# Patient Record
Sex: MALE | Race: WHITE | NOT HISPANIC OR LATINO | Employment: FULL TIME | URBAN - METROPOLITAN AREA
[De-identification: names, ages, dates, MRNs, and addresses within clinical notes are randomized per-mention and may not be internally consistent; named-entity substitution may affect disease eponyms.]

---

## 2019-03-15 ENCOUNTER — TELEPHONE (OUTPATIENT)
Dept: PSYCHIATRY | Facility: CLINIC | Age: 59
End: 2019-03-15

## 2019-03-15 NOTE — TELEPHONE ENCOUNTER
Behavorial Health Outpatient Intake Questions    Referred by: info link    Check with provider before scheduling    Are there any developmental disabilities? No    Does the patient have hearing impairment? No    Does the patient have ICM or CTT? No    Taking injectable psychiatric medications? NoIf yes, patient can not be seen here  Has the patient ever seen or currently see a psychiatrist? No If yes who/when? Has the patient ever seen or currently see a therapist? Yes If yes who/when? In 2004    How many visits did the pt have for previous psychiatric treatment?  History    Has the patient served in the [place] [address]? No    If yes, have you had combat services? No    Was the patient activated into federal active duty as a member of the national guard or reserve? No    Minor Child    Who has custody of the child? Is there a custody agreement? If there is a custody agreement remind parent that they must bring a copy to the first appt or they will not be seen  [facility]     Presenting Problem (in patient's words) suspected ADD    Substance Abuse:No concerns of substance abuse are reported  Has the patient been seen here previously, either inpatient or outpatient? No outpatient    If seen as outpatient, what provider(s) did the patient see? A member of the patient's family has been in therapy here with none    ACCEPTED as a patient  Appointment Date:     Referred Elsewhere? Primary Care Physician: No primary care provider on file  PCP telephone number: Lora  [address] [facility]  ----------------------------------------------------------------------------------------------------------------------    Insurance subscriber:      radha TREVIZO;     Address:                                                        Phone: SSN:    Employer:    [employer]                                                  Address:  ----------------------------------------------------------------------------------------------------------------------    Primary Insurance:  malick                                                               Phone:[phone]    ID number:    [id number]                                               Group number:[id number]  ----------------------------------------------------------------------------------------------------------------------    Secondary Insurance:                                                               Phone:    ID number:                                                   Group number:  ----------------------------------------------------------------------------------------------------------------------    Other insurance information:             _______________________________________

## 2019-04-05 ENCOUNTER — OFFICE VISIT (OUTPATIENT)
Dept: BEHAVIORAL/MENTAL HEALTH CLINIC | Facility: CLINIC | Age: 59
End: 2019-04-05
Payer: COMMERCIAL

## 2019-04-05 DIAGNOSIS — F90.0 ADHD (ATTENTION DEFICIT HYPERACTIVITY DISORDER), INATTENTIVE TYPE: Primary | ICD-10-CM

## 2019-04-05 PROBLEM — Z78.9 KNOWN HEALTH PROBLEMS: NONE: Status: ACTIVE | Noted: 2019-04-05

## 2019-04-05 PROCEDURE — 90792 PSYCH DIAG EVAL W/MED SRVCS: CPT | Performed by: PSYCHIATRY & NEUROLOGY

## 2019-04-05 RX ORDER — ATOMOXETINE 40 MG/1
40 CAPSULE ORAL DAILY
Qty: 7 CAPSULE | Refills: 0 | Status: SHIPPED | OUTPATIENT
Start: 2019-04-05 | End: 2020-01-23 | Stop reason: ALTCHOICE

## 2019-04-05 RX ORDER — ATOMOXETINE 40 MG/1
80 CAPSULE ORAL DAILY
Qty: 60 CAPSULE | Refills: 0 | Status: SHIPPED | OUTPATIENT
Start: 2019-04-12 | End: 2020-01-23 | Stop reason: ALTCHOICE

## 2019-12-01 PROBLEM — Z00.00 ENCOUNTER FOR WELLNESS EXAMINATION IN ADULT: Status: ACTIVE | Noted: 2019-12-01

## 2019-12-01 NOTE — ASSESSMENT & PLAN NOTE
 Screening for metabolic and lipoid disorders indicated at this time  Patient agrees to screening at this time   Immunizations indicated at this time: Influenza  Patient refuses immunization(s) at this time   STI screening indicated at this time  Patient refuses STI screening   Cancer screening: Colonoscopy indicated at this time  Patient refuses screening at this time  Plan   CMP and fasting lipid panel ordered    Will discuss STI screening (including Hep C screening) and colonoscopy at next visit     F/u in 3 months

## 2019-12-01 NOTE — PROGRESS NOTES
Family Medicine Follow-Up Office Visit  Janeen Clemons 61 y o  male   MRN: [id number] : 1960  ENCOUNTER: 2019 12:35 PM    Assessment and Plan   Encounter for wellness examination in adult   Screening for metabolic and lipoid disorders indicated at this time  Patient agrees to screening at this time   Immunizations indicated at this time: Influenza  Patient refuses immunization(s) at this time   STI screening indicated at this time  Patient refuses STI screening   Cancer screening: Colonoscopy indicated at this time  Patient refuses screening at this time  Plan   CMP and fasting lipid panel ordered    Will discuss STI screening (including Hep C screening) and colonoscopy at next visit   F/u in 3 months        Performance anxiety  Pt has recently had anxiety, palpitations, sweating, and "stress" when performing/presenting "in front of large groups for work " The night before his presentations, he has difficulty sleeping and feels anxious about the upcoming presentation as he presents to approx  2500 people at times  He doesn't experience these symptoms otherwise  His friend told him about propranolol and he would like to try it     - He denies chest pain, LOC, light-headedness with change in position/standing from seated position, and SOB  Plan   - Propranolol 10 mg 1-2 hours before presentation/performance    - Pt advised to go to ED if experiences LOC, chest pain, SOB, palpitations  Also advised patient to call clinic/go to ED if experiences new symptoms  Pt advised to call clinic if no improvement in symptoms     - F/u in 3 months, sooner if necessary       Chief Complaint     Chief Complaint   Patient presents with    Anxiety     related to public speaking       History of Present Illness   Janeen Clemons is a 61y o -year-old male with a PMH of No past medical history on file   who presents today for Anxiety (related to public speaking)       Now performing in front of larger group, 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 and has been feeling "stressed " Friend told him about propranolol and says it worked for him and would like to try it  Hasn't had this anxiety infront of smaller groups  Night before he can't sleep and is stressed  "I make it through but the presentations are rough " Has a hard time breathing and can feel his heart "pounding " "It's what most people describe as stage fright "     Anxiety   Patient reports no chest pain, confusion, dizziness, nausea or palpitations  FamHx  family history includes ADD / ADHD in his sister; COPD in his mother; Depression in his father; Hypertension in his father  Review of Systems   Review of Systems   Constitutional: Negative for chills and fever  HENT: Negative for rhinorrhea  Eyes: Negative for visual disturbance  Respiratory: Negative for cough  Cardiovascular: Negative for chest pain, palpitations and leg swelling  Denies SOB, light-headedness with change in position    Gastrointestinal: Negative for constipation, diarrhea, nausea and vomiting  Endocrine: Positive for polydipsia and polyuria (had prostate exam, was told it was swollen)  Genitourinary: Positive for difficulty urinating  Negative for dysuria  Musculoskeletal: Positive for arthralgias (right middle finger pain after fall 3 months ago)  Skin: Negative for rash  Allergic/Immunologic:        NKDA   Neurological: Negative for dizziness  Psychiatric/Behavioral: Negative for confusion  All other systems reviewed and are negative  Active Problem List     Patient Active Problem List   Diagnosis    Known health problems: none    ADHD (attention deficit hyperactivity disorder), inattentive type    Encounter for wellness examination in adult    Performance anxiety       Past Medical History, Past Surgical History, Family History, and Social History were reviewed and updated today as appropriate      Objective   /88 (BP Location: Right arm, Patient Position: Sitting, Cuff Size: Large)   Pulse 64   Temp (!) 96 9 °F (36 1 °C)   Resp 20   Ht 6' (1 829 m)   Wt 94 7 kg (208 lb 12 8 oz)   BMI 28 32 kg/m²     Physical Exam   Constitutional: He is oriented to person, place, and time  No distress  HENT:   Head: Normocephalic and atraumatic  Nose: Nose normal    Eyes: Conjunctivae are normal  Right eye exhibits no discharge  Left eye exhibits no discharge  No scleral icterus  Neck: No JVD present  Cardiovascular: Normal rate, regular rhythm, normal heart sounds and intact distal pulses  Exam reveals no gallop and no friction rub  No murmur heard  Radial pulses strong and regular bilaterally     Pulmonary/Chest: Effort normal and breath sounds normal  No stridor  No respiratory distress  He has no wheezes  He has no rales  He exhibits no tenderness  Abdominal: Soft  Bowel sounds are normal  He exhibits no distension  There is no tenderness  There is no guarding  Musculoskeletal: He exhibits no edema (of lower extremities)  Neurological: He is alert and oriented to person, place, and time  Skin: Skin is warm and dry  Capillary refill takes less than 2 seconds  He is not diaphoretic  Psychiatric: He has a normal mood and affect  Nursing note and vitals reviewed  Pertinent Laboratory/Diagnostic Studies:  No results found for: GLUCOSE, BUN, CREATININE, CALCIUM, NA, K, CO2, CL  No results found for: ALT, AST, GGT, ALKPHOS, BILITOT    No results found for: WBC, HGB, HCT, MCV, PLT    No results found for: TSH    No results found for: CHOL  No results found for: TRIG  No results found for: HDL  No results found for: LDLCALC  No results found for: HGBA1C    No results found for this or any previous visit      Orders Placed This Encounter   Procedures    Comprehensive metabolic panel    Lipid Panel with Direct LDL reflex         Current Medications     Current Outpatient Medications   Medication Sig Dispense Refill    atoMOXetine (STRATTERA) 40 mg capsule Take 1 capsule (40 mg total) by mouth daily For 7 days (Patient not taking: Reported on 12/2/2019) 7 capsule 0    atomoxetine (STRATTERA) 40 mg capsule Take 2 capsules (80 mg total) by mouth daily (Patient not taking: Reported on 12/2/2019) 60 capsule 0    propranolol (INDERAL) 10 mg tablet Take 1 tablet (10 mg total) by mouth as needed (Take 1-2 hours prior to performance ) 30 tablet 0     No current facility-administered medications for this visit  ALLERGIES:  No Known Allergies    Health Maintenance     Health Maintenance   Topic Date Due    Hepatitis C Screening  1960    Depression Screening PHQ  1960    CRC Screening: Colonoscopy  1960    DTaP,Tdap,and Td Vaccines (1 - Tdap) 10/02/1971    HIV Screening  10/02/1975    BMI: Followup Plan  10/02/1978    Influenza Vaccine  07/01/2019    BMI: Adult  12/02/2020    Pneumococcal Vaccine: 65+ Years (1 of 2 - PCV13) 10/02/2025    Pneumococcal Vaccine: Pediatrics (0 to 5 Years) and At-Risk Patients (6 to 59 Years)  Aged Out    HIB Vaccine  Aged Out    Hepatitis B Vaccine  Aged Out    IPV Vaccine  Aged Out    Hepatitis A Vaccine  Aged Out    Meningococcal ACWY Vaccine  Aged Out    HPV Vaccine  Aged Out       There is no immunization history on file for this patient  Marybel Moscoso MD   [address]  12/2/2019  12:35 PM    Parts of this note were dictated using [employer] dictation software and may have sounds-like errors due to variation in pronunciation

## 2019-12-02 ENCOUNTER — OFFICE VISIT (OUTPATIENT)
Dept: FAMILY MEDICINE CLINIC | Facility: CLINIC | Age: 59
End: 2019-12-02
Payer: COMMERCIAL

## 2019-12-02 VITALS
SYSTOLIC BLOOD PRESSURE: 130 MMHG | HEIGHT: 72 IN | TEMPERATURE: 96.9 F | DIASTOLIC BLOOD PRESSURE: 88 MMHG | WEIGHT: 208.8 LBS | RESPIRATION RATE: 20 BRPM | BODY MASS INDEX: 28.28 KG/M2 | HEART RATE: 64 BPM

## 2019-12-02 DIAGNOSIS — R35.89 POLYURIA: ICD-10-CM

## 2019-12-02 DIAGNOSIS — Z13.220 SCREENING FOR LIPOID DISORDERS: ICD-10-CM

## 2019-12-02 DIAGNOSIS — Z00.00 ENCOUNTER FOR WELLNESS EXAMINATION IN ADULT: ICD-10-CM

## 2019-12-02 DIAGNOSIS — F41.8 PERFORMANCE ANXIETY: Primary | ICD-10-CM

## 2019-12-02 PROCEDURE — 99213 OFFICE O/P EST LOW 20 MIN: CPT | Performed by: FAMILY MEDICINE

## 2019-12-02 PROCEDURE — 3008F BODY MASS INDEX DOCD: CPT | Performed by: FAMILY MEDICINE

## 2019-12-02 RX ORDER — PROPRANOLOL HYDROCHLORIDE 10 MG/1
10 TABLET ORAL AS NEEDED
Qty: 30 TABLET | Refills: 0 | Status: SHIPPED | OUTPATIENT
Start: 2019-12-02

## 2019-12-02 NOTE — ASSESSMENT & PLAN NOTE
Pt has recently had anxiety, palpitations, sweating, and "stress" when performing/presenting "in front of large groups for work " The night before his presentations, he has difficulty sleeping and feels anxious about the upcoming presentation as he presents to approx  2500 people at times  He doesn't experience these symptoms otherwise  His friend told him about propranolol and he would like to try it     - He denies chest pain, LOC, light-headedness with change in position/standing from seated position, and SOB  Plan   - Propranolol 10 mg 1-2 hours before presentation/performance    - Pt advised to go to ED if experiences LOC, chest pain, SOB, palpitations  Also advised patient to call clinic/go to ED if experiences new symptoms   Pt advised to call clinic if no improvement in symptoms     - F/u in 3 months, sooner if necessary

## 2019-12-02 NOTE — PATIENT INSTRUCTIONS
Please seek medical attention/ go to emergency department if you experience loss of consciousness  If you develop any new symptoms while taking your medications, please call clinic or go to emergency department

## 2020-01-03 ENCOUNTER — TELEPHONE (OUTPATIENT)
Dept: FAMILY MEDICINE CLINIC | Facility: CLINIC | Age: 60
End: 2020-01-03

## 2020-01-17 ENCOUNTER — TELEPHONE (OUTPATIENT)
Dept: FAMILY MEDICINE CLINIC | Facility: CLINIC | Age: 60
End: 2020-01-17

## 2020-01-22 NOTE — PROGRESS NOTES
Family Medicine Follow-Up Office Visit  Adonis Valenzuela 61 y o  male   MRN: [id number] : 1960  ENCOUNTER: 2020 11:11 AM    Assessment and Plan   ADHD (attention deficit hyperactivity disorder), inattentive type  Previously intolerant of atomoxetine  Prescribed a trial of Vyvanse as recommended by Behavioral Health  Side-effect profile discussed  Patient will reach out with any decrease in appetite, palpitations, jittery feelings  I recommended trying medication for the 1st time on the weekend, and monitoring for any negative effect on his performance anxiety  Performance anxiety  Significant improvement with propranolol, only using a half dose each time  RTC 1-3 month for BW review and revisiting of ADHD  Pt will call colorectal team re: colonoscopy  Chief Complaint     Chief Complaint   Patient presents with    Follow-up     ADD       History of Present Illness   Adonis Valenzuela is a 61y o -year-old male who presents today for followup of performance anxiety  Felt like a half-dose of the propranolol helped quite a bit with presentations  Whole dose led to headache later  Was introduced to the concept of maybe having ADHD earlier this year, had confirmatory testing by behavioral health and started Strattera  Didn't do well, felt "stoned "  The doc had recommended perhaps trying Vyvanse - that physician has since left the country  No history of heart issues, palpitations  Review of Systems   Review of Systems   Constitutional: Negative for activity change, chills, fatigue and fever  HENT: Negative for congestion, sinus pressure, sinus pain and sore throat  Respiratory: Negative for cough, shortness of breath and wheezing  Cardiovascular: Negative for chest pain, palpitations and leg swelling  Gastrointestinal: Negative for abdominal pain, diarrhea, nausea and vomiting  Genitourinary: Negative for decreased urine volume, dysuria, frequency and urgency  Musculoskeletal: Negative for arthralgias, myalgias, neck pain and neck stiffness  Skin: Negative for rash  Neurological: Negative for dizziness, light-headedness, numbness and headaches  Psychiatric/Behavioral: Negative for agitation  Focus issues  Active Problem List     Patient Active Problem List   Diagnosis    Known health problems: none    ADHD (attention deficit hyperactivity disorder), inattentive type    Encounter for wellness examination in adult    Performance anxiety       Past Medical History, Past Surgical History, Family History, and Social History were reviewed and updated today as appropriate  Objective   /80   Pulse 98   Temp (!) 96 1 °F (35 6 °C)   Ht 6' (1 829 m)   Wt 97 5 kg (215 lb)   SpO2 96%   BMI 29 16 kg/m²     Physical Exam   Constitutional: He is oriented to person, place, and time  He appears well-developed and well-nourished  No distress  HENT:   Head: Normocephalic and atraumatic  Mouth/Throat: Oropharynx is clear and moist    Eyes: Pupils are equal, round, and reactive to light  Neck: Normal range of motion  Neck supple  Cardiovascular: Normal rate, regular rhythm and normal heart sounds  Exam reveals no gallop and no friction rub  No murmur heard  Pulmonary/Chest: Effort normal and breath sounds normal  No respiratory distress  He has no wheezes  He has no rales  Abdominal: Soft  There is no tenderness  Musculoskeletal: Normal range of motion  Neurological: He is alert and oriented to person, place, and time  Psychiatric: He has a normal mood and affect   Thought content normal      Diabetic Foot Exam    Pertinent Laboratory/Diagnostic Studies:  No results found for: GLUCOSE, BUN, CREATININE, CALCIUM, NA, K, CO2, CL  No results found for: ALT, AST, GGT, ALKPHOS, BILITOT    No results found for: WBC, HGB, HCT, MCV, PLT    No results found for: TSH    No results found for: CHOL  No results found for: TRIG  No results found for: HDL  No results found for: LDLCALC  No results found for: HGBA1C    No results found for this or any previous visit  No orders of the defined types were placed in this encounter  Current Medications     Current Outpatient Medications   Medication Sig Dispense Refill    propranolol (INDERAL) 10 mg tablet Take 1 tablet (10 mg total) by mouth as needed (Take 1-2 hours prior to performance ) 30 tablet 0    lisdexamfetamine (VYVANSE) 30 MG capsule Take 1 capsule (30 mg total) by mouth every morningMax Daily Amount: 30 mg 30 capsule 0     No current facility-administered medications for this visit  ALLERGIES:  No Known Allergies    Health Maintenance     Health Maintenance   Topic Date Due    Hepatitis C Screening  1960    CRC Screening: Colonoscopy  1960    DTaP,Tdap,and Td Vaccines (1 - Tdap) 10/02/1971    HIV Screening  10/02/1975    BMI: Followup Plan  10/02/1978    Annual Physical  10/02/1978    Influenza Vaccine  07/01/2019    Depression Screening PHQ  01/23/2021    BMI: Adult  01/23/2021    Pneumococcal Vaccine: 65+ Years (1 of 2 - PCV13) 10/02/2025    Pneumococcal Vaccine: Pediatrics (0 to 5 Years) and At-Risk Patients (6 to 59 Years)  Aged Out    HIB Vaccine  Aged Out    Hepatitis B Vaccine  Aged Out    IPV Vaccine  Aged Out    Hepatitis A Vaccine  Aged Out    Meningococcal ACWY Vaccine  Aged Out    HPV Vaccine  Aged Out       There is no immunization history on file for this patient  Any Reed MD   [address]  1/23/2020  11:11 AM    Parts of this note were dictated using [employer] dictation software and may have sounds-like errors due to variation in pronunciation

## 2020-01-23 ENCOUNTER — OFFICE VISIT (OUTPATIENT)
Dept: FAMILY MEDICINE CLINIC | Facility: CLINIC | Age: 60
End: 2020-01-23
Payer: COMMERCIAL

## 2020-01-23 VITALS
OXYGEN SATURATION: 96 % | WEIGHT: 215 LBS | HEART RATE: 98 BPM | SYSTOLIC BLOOD PRESSURE: 130 MMHG | BODY MASS INDEX: 29.12 KG/M2 | DIASTOLIC BLOOD PRESSURE: 80 MMHG | TEMPERATURE: 96.1 F | HEIGHT: 72 IN

## 2020-01-23 DIAGNOSIS — F90.0 ADHD (ATTENTION DEFICIT HYPERACTIVITY DISORDER), INATTENTIVE TYPE: Primary | ICD-10-CM

## 2020-01-23 DIAGNOSIS — F41.8 PERFORMANCE ANXIETY: ICD-10-CM

## 2020-01-23 PROCEDURE — 99214 OFFICE O/P EST MOD 30 MIN: CPT | Performed by: FAMILY MEDICINE

## 2020-01-23 PROCEDURE — 3008F BODY MASS INDEX DOCD: CPT | Performed by: FAMILY MEDICINE

## 2020-01-23 NOTE — ASSESSMENT & PLAN NOTE
Previously intolerant of atomoxetine  Prescribed a trial of Vyvanse as recommended by Behavioral Health  Side-effect profile discussed  Patient will reach out with any decrease in appetite, palpitations, jittery feelings  I recommended trying medication for the 1st time on the weekend, and monitoring for any negative effect on his performance anxiety

## 2020-02-25 DIAGNOSIS — F90.0 ADHD (ATTENTION DEFICIT HYPERACTIVITY DISORDER), INATTENTIVE TYPE: ICD-10-CM

## 2020-02-25 NOTE — TELEPHONE ENCOUNTER
Strike that refills not permitted on Schedule II meds  OK to fill with just a phone call next month

## 2020-02-25 NOTE — TELEPHONE ENCOUNTER
Refills are reasonable in this case  I ordered a fill with 2 refills, and would like to see him approx 3 months after our last visit to see how he's doing  We can then space out visits from there

## 2020-02-25 NOTE — TELEPHONE ENCOUNTER
Is this pt able to get additional refill or do you prefer to order this monthly  He is doing well with the vyvanse

## 2020-03-30 DIAGNOSIS — F90.0 ADHD (ATTENTION DEFICIT HYPERACTIVITY DISORDER), INATTENTIVE TYPE: ICD-10-CM

## 2020-05-18 DIAGNOSIS — F90.0 ADHD (ATTENTION DEFICIT HYPERACTIVITY DISORDER), INATTENTIVE TYPE: ICD-10-CM

## 2020-05-26 ENCOUNTER — TELEPHONE (OUTPATIENT)
Dept: FAMILY MEDICINE CLINIC | Facility: CLINIC | Age: 60
End: 2020-05-26

## 2020-05-26 ENCOUNTER — OFFICE VISIT (OUTPATIENT)
Dept: FAMILY MEDICINE CLINIC | Facility: CLINIC | Age: 60
End: 2020-05-26
Payer: COMMERCIAL

## 2020-05-26 VITALS
TEMPERATURE: 98.7 F | BODY MASS INDEX: 28.69 KG/M2 | WEIGHT: 211.8 LBS | DIASTOLIC BLOOD PRESSURE: 78 MMHG | SYSTOLIC BLOOD PRESSURE: 124 MMHG | HEIGHT: 72 IN | HEART RATE: 88 BPM | RESPIRATION RATE: 18 BRPM | OXYGEN SATURATION: 95 %

## 2020-05-26 DIAGNOSIS — F90.0 ADHD (ATTENTION DEFICIT HYPERACTIVITY DISORDER), INATTENTIVE TYPE: Primary | ICD-10-CM

## 2020-05-26 DIAGNOSIS — Z12.11 COLON CANCER SCREENING: ICD-10-CM

## 2020-05-26 DIAGNOSIS — J45.30 MILD PERSISTENT ASTHMA WITHOUT COMPLICATION: ICD-10-CM

## 2020-05-26 PROBLEM — Z00.00 ROUTINE HEALTH MAINTENANCE: Status: ACTIVE | Noted: 2020-05-26

## 2020-05-26 PROCEDURE — 1036F TOBACCO NON-USER: CPT | Performed by: FAMILY MEDICINE

## 2020-05-26 PROCEDURE — 3008F BODY MASS INDEX DOCD: CPT | Performed by: FAMILY MEDICINE

## 2020-05-26 PROCEDURE — 99214 OFFICE O/P EST MOD 30 MIN: CPT | Performed by: FAMILY MEDICINE

## 2020-05-26 RX ORDER — BUDESONIDE AND FORMOTEROL FUMARATE DIHYDRATE 160; 4.5 UG/1; UG/1
2 AEROSOL RESPIRATORY (INHALATION) 2 TIMES DAILY
Qty: 1 INHALER | Refills: 3 | Status: SHIPPED | OUTPATIENT
Start: 2020-05-26

## 2020-05-26 RX ORDER — BUDESONIDE AND FORMOTEROL FUMARATE DIHYDRATE 160; 4.5 UG/1; UG/1
2 AEROSOL RESPIRATORY (INHALATION) 2 TIMES DAILY
COMMUNITY
End: 2020-05-26 | Stop reason: SDUPTHER

## 2020-05-31 ENCOUNTER — HOSPITAL ENCOUNTER (EMERGENCY)
Facility: HOSPITAL | Age: 60
Discharge: HOME | End: 2020-05-31
Attending: EMERGENCY MEDICINE | Admitting: EMERGENCY MEDICINE
Payer: COMMERCIAL

## 2020-05-31 ENCOUNTER — APPOINTMENT (EMERGENCY)
Dept: RADIOLOGY | Facility: HOSPITAL | Age: 60
End: 2020-05-31
Payer: COMMERCIAL

## 2020-05-31 DIAGNOSIS — S42.032A CLOSED DISPLACED FRACTURE OF ACROMIAL END OF LEFT CLAVICLE, INITIAL ENCOUNTER: Primary | ICD-10-CM
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
## 2020-05-31 PROCEDURE — 73000 X-RAY EXAM OF COLLAR BONE: CPT

## 2020-05-31 PROCEDURE — 73080 X-RAY EXAM OF ELBOW: CPT

## 2020-05-31 PROCEDURE — 90715 TDAP VACCINE 7 YRS/> IM: CPT | Performed by: EMERGENCY MEDICINE

## 2020-05-31 PROCEDURE — 90471 IMMUNIZATION ADMIN: CPT

## 2020-05-31 PROCEDURE — 99284 EMERGENCY DEPT VISIT MOD MDM: CPT | Performed by: EMERGENCY MEDICINE

## 2020-05-31 PROCEDURE — 71045 X-RAY EXAM CHEST 1 VIEW: CPT

## 2020-05-31 PROCEDURE — 99284 EMERGENCY DEPT VISIT MOD MDM: CPT

## 2020-05-31 PROCEDURE — 73030 X-RAY EXAM OF SHOULDER: CPT

## 2020-05-31 RX ORDER — OXYCODONE HYDROCHLORIDE 5 MG/1
5 TABLET ORAL ONCE
Status: COMPLETED | OUTPATIENT
Start: 2020-05-31 | End: 2020-05-31

## 2020-05-31 RX ORDER — METHOCARBAMOL 500 MG/1
500 TABLET, FILM COATED ORAL 3 TIMES DAILY PRN
Qty: 20 TABLET | Refills: 0 | Status: SHIPPED | OUTPATIENT
Start: 2020-05-31

## 2020-05-31 RX ORDER — MORPHINE SULFATE 15 MG/1
7.5 TABLET ORAL EVERY 4 HOURS PRN
Qty: 11 TABLET | Refills: 0 | Status: SHIPPED | OUTPATIENT
Start: 2020-05-31 | End: 2020-06-10

## 2020-05-31 RX ORDER — NAPROXEN 375 MG/1
375 TABLET ORAL 2 TIMES DAILY WITH MEALS
Qty: 30 TABLET | Refills: 0 | Status: SHIPPED | OUTPATIENT
Start: 2020-05-31

## 2020-05-31 RX ORDER — ACETAMINOPHEN 325 MG/1
975 TABLET ORAL ONCE
Status: COMPLETED | OUTPATIENT
Start: 2020-05-31 | End: 2020-05-31

## 2020-05-31 RX ORDER — ACETAMINOPHEN 500 MG
1000 TABLET ORAL EVERY 6 HOURS PRN
Qty: 30 TABLET | Refills: 0 | Status: SHIPPED | OUTPATIENT
Start: 2020-05-31

## 2020-05-31 RX ADMIN — OXYCODONE HYDROCHLORIDE 5 MG: 5 TABLET ORAL at 16:53

## 2020-05-31 RX ADMIN — ACETAMINOPHEN 975 MG: 325 TABLET ORAL at 16:53

## 2020-05-31 RX ADMIN — OXYCODONE HYDROCHLORIDE 5 MG: 5 TABLET ORAL at 18:31

## 2020-05-31 RX ADMIN — TETANUS TOXOID, REDUCED DIPHTHERIA TOXOID AND ACELLULAR PERTUSSIS VACCINE, ADSORBED 0.5 ML: 5; 2.5; 8; 8; 2.5 SUSPENSION INTRAMUSCULAR at 18:14

## 2020-06-02 VITALS — BODY MASS INDEX: 28.85 KG/M2 | HEIGHT: 72 IN | WEIGHT: 213 LBS

## 2020-06-02 DIAGNOSIS — S42.032A DISPLACED FRACTURE OF LATERAL END OF LEFT CLAVICLE, INITIAL ENCOUNTER FOR CLOSED FRACTURE: Primary | ICD-10-CM

## 2020-06-02 PROCEDURE — 3008F BODY MASS INDEX DOCD: CPT | Performed by: ORTHOPAEDIC SURGERY

## 2020-06-02 PROCEDURE — 99204 OFFICE O/P NEW MOD 45 MIN: CPT | Performed by: ORTHOPAEDIC SURGERY

## 2020-06-02 PROCEDURE — 1036F TOBACCO NON-USER: CPT | Performed by: ORTHOPAEDIC SURGERY

## 2020-06-02 RX ORDER — CHLORHEXIDINE GLUCONATE 4 G/100ML
SOLUTION TOPICAL DAILY PRN
Status: CANCELLED | OUTPATIENT
Start: 2020-06-02

## 2020-06-03 VITALS
WEIGHT: 213 LBS | SYSTOLIC BLOOD PRESSURE: 159 MMHG | OXYGEN SATURATION: 96 % | RESPIRATION RATE: 16 BRPM | TEMPERATURE: 97.9 F | HEART RATE: 84 BPM | BODY MASS INDEX: 28.89 KG/M2 | DIASTOLIC BLOOD PRESSURE: 86 MMHG

## 2020-06-03 LAB
BUN SERPL-MCNC: 19 MG/DL (ref 7–25)
BUN/CREAT SERPL: NORMAL (CALC) (ref 6–22)
CALCIUM SERPL-MCNC: 8.7 MG/DL (ref 8.6–10.3)
CHLORIDE SERPL-SCNC: 106 MMOL/L (ref 98–110)
CO2 SERPL-SCNC: 28 MMOL/L (ref 20–32)
CREAT SERPL-MCNC: 0.93 MG/DL (ref 0.7–1.33)
GLUCOSE SERPL-MCNC: 82 MG/DL (ref 65–139)
POTASSIUM SERPL-SCNC: 4.4 MMOL/L (ref 3.5–5.3)
SL AMB EGFR AFRICAN AMERICAN: 104 ML/MIN/1.73M2
SL AMB EGFR NON AFRICAN AMERICAN: 90 ML/MIN/1.73M2
SODIUM SERPL-SCNC: 141 MMOL/L (ref 135–146)

## 2020-11-16 DIAGNOSIS — F90.0 ADHD (ATTENTION DEFICIT HYPERACTIVITY DISORDER), INATTENTIVE TYPE: ICD-10-CM

## 2021-06-01 ENCOUNTER — TELEPHONE (OUTPATIENT)
Dept: FAMILY MEDICINE CLINIC | Facility: CLINIC | Age: 61
End: 2021-06-01

## 2021-06-01 DIAGNOSIS — F90.0 ADHD (ATTENTION DEFICIT HYPERACTIVITY DISORDER), INATTENTIVE TYPE: ICD-10-CM

## 2021-06-01 NOTE — TELEPHONE ENCOUNTER
Patient called requesting a refill of his Vyvanse 30mg  Patient stated that he is still at his summer house in [place]  patient stated he will be back in [place] in about a month or so  Patient requesting script be sent to [facility] in [place]  Patient stated he has 4 capsules left